# Patient Record
Sex: MALE | Race: BLACK OR AFRICAN AMERICAN | NOT HISPANIC OR LATINO | Employment: OTHER | URBAN - METROPOLITAN AREA
[De-identification: names, ages, dates, MRNs, and addresses within clinical notes are randomized per-mention and may not be internally consistent; named-entity substitution may affect disease eponyms.]

---

## 2020-06-16 PROBLEM — I46.9 CARDIAC ARREST: Status: ACTIVE | Noted: 2020-06-16

## 2020-06-16 PROBLEM — J96.22 ACUTE ON CHRONIC RESPIRATORY FAILURE WITH HYPERCAPNIA: Status: ACTIVE | Noted: 2020-06-16

## 2020-06-18 PROBLEM — J96.01 ACUTE RESPIRATORY FAILURE WITH HYPOXIA: Status: ACTIVE | Noted: 2020-06-16

## 2020-06-24 PROBLEM — E11.9 DIABETES MELLITUS: Status: ACTIVE | Noted: 2020-06-24

## 2020-06-24 PROBLEM — I10 ESSENTIAL HYPERTENSION: Status: ACTIVE | Noted: 2020-06-24

## 2020-08-27 PROBLEM — F17.210 CIGARETTE NICOTINE DEPENDENCE WITHOUT COMPLICATION: Status: ACTIVE | Noted: 2020-08-27

## 2023-04-22 PROBLEM — E87.1 HYPONATREMIA: Status: ACTIVE | Noted: 2023-04-22

## 2023-04-22 PROBLEM — N17.9 AKI (ACUTE KIDNEY INJURY): Status: ACTIVE | Noted: 2023-04-22

## 2023-04-22 PROBLEM — N30.00 ACUTE CYSTITIS WITHOUT HEMATURIA: Status: ACTIVE | Noted: 2023-04-22

## 2023-04-22 PROBLEM — I50.32 CHRONIC DIASTOLIC CONGESTIVE HEART FAILURE: Status: ACTIVE | Noted: 2023-04-22

## 2023-04-22 PROBLEM — E66.01 MORBID OBESITY DUE TO EXCESS CALORIES: Status: ACTIVE | Noted: 2023-04-22

## 2023-04-23 PROBLEM — I47.10 SUPRAVENTRICULAR TACHYCARDIA, PAROXYSMAL: Status: ACTIVE | Noted: 2023-04-23

## 2023-04-23 PROBLEM — R79.89 ELEVATED LACTIC ACID LEVEL: Status: ACTIVE | Noted: 2023-04-23

## 2023-04-24 PROBLEM — I47.10 SUPRAVENTRICULAR TACHYCARDIA, PAROXYSMAL: Status: RESOLVED | Noted: 2023-04-23 | Resolved: 2023-04-24

## 2023-04-24 PROBLEM — R50.9 FEVER: Status: ACTIVE | Noted: 2023-04-24

## 2023-04-24 PROBLEM — R00.0 TACHYCARDIA: Status: ACTIVE | Noted: 2023-04-24

## 2023-04-24 PROBLEM — N39.0 URINARY TRACT INFECTION WITH HEMATURIA: Status: ACTIVE | Noted: 2023-04-24

## 2023-04-24 PROBLEM — R31.9 URINARY TRACT INFECTION WITH HEMATURIA: Status: ACTIVE | Noted: 2023-04-24

## 2023-04-25 PROBLEM — R79.89 ELEVATED LACTIC ACID LEVEL: Status: RESOLVED | Noted: 2023-04-23 | Resolved: 2023-04-25

## 2023-04-25 PROBLEM — R78.81 BACTEREMIA: Status: ACTIVE | Noted: 2023-04-25

## 2023-04-25 PROBLEM — E87.1 HYPONATREMIA: Status: RESOLVED | Noted: 2023-04-22 | Resolved: 2023-04-25

## 2023-04-25 PROBLEM — I48.0 PAROXYSMAL A-FIB: Status: ACTIVE | Noted: 2023-04-25

## 2023-04-25 PROBLEM — J18.9 COMMUNITY ACQUIRED PNEUMONIA OF LEFT LOWER LOBE OF LUNG: Status: ACTIVE | Noted: 2023-04-25

## 2023-04-27 PROBLEM — N17.9 AKI (ACUTE KIDNEY INJURY): Status: RESOLVED | Noted: 2023-04-22 | Resolved: 2023-04-27

## 2023-04-28 ENCOUNTER — PATIENT OUTREACH (OUTPATIENT)
Dept: ADMINISTRATIVE | Facility: CLINIC | Age: 73
End: 2023-04-28

## 2023-04-28 NOTE — PROGRESS NOTES
C3 nurse attempted to contact [facility]  for a TCC post hospital discharge follow up call. No answer. The patient does not have a scheduled HOSFU appointment, and the pt does not have an [facility] PCP.

## 2023-05-02 NOTE — PROGRESS NOTES
3rd Attempt made to reach patient for TCC call. No answer, no vm options available.   Detail Level: Zone

## 2023-05-09 PROBLEM — I49.3 PREMATURE VENTRICULAR CONTRACTION: Status: ACTIVE | Noted: 2023-05-09

## 2023-07-17 PROBLEM — E78.2 MIXED HYPERLIPIDEMIA: Status: ACTIVE | Noted: 2023-07-17

## 2023-07-17 PROBLEM — R07.9 CHEST PAIN: Status: ACTIVE | Noted: 2023-07-17

## 2023-07-24 ENCOUNTER — PATIENT OUTREACH (OUTPATIENT)
Dept: ADMINISTRATIVE | Facility: HOSPITAL | Age: 73
End: 2023-07-24

## 2023-07-24 PROBLEM — R31.9 URINARY TRACT INFECTION WITH HEMATURIA: Status: RESOLVED | Noted: 2023-04-24 | Resolved: 2023-07-24

## 2023-07-24 PROBLEM — N39.0 URINARY TRACT INFECTION WITH HEMATURIA: Status: RESOLVED | Noted: 2023-04-24 | Resolved: 2023-07-24

## 2023-07-25 PROBLEM — I48.0 PAROXYSMAL A-FIB: Status: RESOLVED | Noted: 2023-04-25 | Resolved: 2023-07-25

## 2023-07-31 PROBLEM — J18.9 COMMUNITY ACQUIRED PNEUMONIA OF LEFT LOWER LOBE OF LUNG: Status: RESOLVED | Noted: 2023-04-25 | Resolved: 2023-07-31

## 2023-08-21 ENCOUNTER — PATIENT OUTREACH (OUTPATIENT)
Dept: ADMINISTRATIVE | Facility: HOSPITAL | Age: 73
End: 2023-08-21

## 2023-08-22 ENCOUNTER — PATIENT OUTREACH (OUTPATIENT)
Dept: ADMINISTRATIVE | Facility: HOSPITAL | Age: 73
End: 2023-08-22

## 2023-10-10 PROBLEM — R14.0 ABDOMINAL DISTENSION: Status: ACTIVE | Noted: 2023-10-10

## 2023-10-10 PROBLEM — F19.90 DRUG USE: Status: ACTIVE | Noted: 2023-10-10

## 2023-10-11 PROBLEM — R07.9 CHEST PAIN: Status: RESOLVED | Noted: 2023-07-17 | Resolved: 2023-10-11

## 2023-10-11 PROBLEM — R14.0 ABDOMINAL DISTENSION: Status: RESOLVED | Noted: 2023-10-10 | Resolved: 2023-10-11
